# Patient Record
Sex: MALE | Race: WHITE | NOT HISPANIC OR LATINO | Employment: STUDENT | URBAN - METROPOLITAN AREA
[De-identification: names, ages, dates, MRNs, and addresses within clinical notes are randomized per-mention and may not be internally consistent; named-entity substitution may affect disease eponyms.]

---

## 2017-12-14 ENCOUNTER — GENERIC CONVERSION - ENCOUNTER (OUTPATIENT)
Dept: OTHER | Facility: OTHER | Age: 22
End: 2017-12-14

## 2017-12-15 ENCOUNTER — HOSPITAL ENCOUNTER (EMERGENCY)
Facility: HOSPITAL | Age: 22
Discharge: HOME | End: 2017-12-15
Attending: EMERGENCY MEDICINE | Admitting: EMERGENCY MEDICINE
Payer: COMMERCIAL

## 2017-12-15 VITALS
HEART RATE: 77 BPM | DIASTOLIC BLOOD PRESSURE: 58 MMHG | WEIGHT: 126.1 LBS | RESPIRATION RATE: 14 BRPM | OXYGEN SATURATION: 97 % | TEMPERATURE: 98.9 F | SYSTOLIC BLOOD PRESSURE: 119 MMHG

## 2017-12-15 DIAGNOSIS — L05.01 PILONIDAL ABSCESS: Primary | ICD-10-CM

## 2017-12-15 PROCEDURE — 99283 EMERGENCY DEPT VISIT LOW MDM: CPT

## 2017-12-15 RX ORDER — LIDOCAINE 40 MG/G
CREAM TOPICAL ONCE
Status: COMPLETED | OUTPATIENT
Start: 2017-12-15 | End: 2017-12-15

## 2017-12-15 RX ORDER — MORPHINE SULFATE 15 MG/1
15 TABLET ORAL ONCE
Status: COMPLETED | OUTPATIENT
Start: 2017-12-15 | End: 2017-12-15

## 2017-12-15 RX ORDER — LORAZEPAM 0.5 MG/1
0.5 TABLET ORAL ONCE
Status: COMPLETED | OUTPATIENT
Start: 2017-12-15 | End: 2017-12-15

## 2017-12-15 RX ORDER — MORPHINE SULFATE 30 MG/1
30 TABLET ORAL EVERY 4 HOURS PRN
Qty: 5 TABLET | Refills: 0 | Status: ON HOLD | OUTPATIENT
Start: 2017-12-15 | End: 2018-03-05 | Stop reason: ALTCHOICE

## 2017-12-15 RX ORDER — BUPIVACAINE HYDROCHLORIDE 5 MG/ML
30 INJECTION, SOLUTION EPIDURAL; INTRACAUDAL ONCE
Status: COMPLETED | OUTPATIENT
Start: 2017-12-15 | End: 2017-12-15

## 2017-12-15 RX ADMIN — LORAZEPAM 0.5 MG: 0.5 TABLET ORAL at 15:01

## 2017-12-15 RX ADMIN — MORPHINE SULFATE 15 MG: 15 TABLET ORAL at 14:24

## 2017-12-15 RX ADMIN — LIDOCAINE: 40 CREAM TOPICAL at 15:05

## 2017-12-15 RX ADMIN — BUPIVACAINE HYDROCHLORIDE 30 ML: 5 INJECTION, SOLUTION EPIDURAL; INTRACAUDAL at 14:24

## 2017-12-15 NOTE — DISCHARGE INSTRUCTIONS
Diagnosis: pilonidal abscess    - expect bloody drainage from area over next week - apply dressing as needed  Until drainage decreases    - wash gently with soap and water daily    - have someone inspect area aily -- please return to  t he er for any spreading redness around area any increasing pain - swelling- any redness/swelling around anus / any fevers- temp > 100 4    - please apply warm soapy soaks  To area once daily for 30 minutes at a time for next week     - the wound will heal over next several weeks     - please keep your appt with dr Artem Daugherty for next week     - for pain- can try over the counter naproxen 2-220 mg tablets taken with 2-500 mg over the counter tylenol tablets 2 times a day with meals    - for severe pain- morphine 1/2 to 1 tablet every 4-67 hrs as needed - note can cause nausea/ vomiting

## 2017-12-15 NOTE — ED PROCEDURE NOTE
PROCEDURE  Incision/Drainage  Date/Time: 12/15/2017 4:52 PM  Performed by: Donaldo Brito  Authorized by: Donaldo Brito     Patient location:  ED and bedside  Other Assisting Provider: Yes (comment)    Consent:     Consent obtained:  Verbal    Consent given by:  Patient and parent    Risks discussed:  Bleeding    Alternatives discussed:  No treatment  Universal protocol:     Procedure explained and questions answered to patient or proxy's satisfaction: yes      Patient identity confirmed:  Verbally with patient  Location:     Type:  Abscess (pilonidal area)    Size:  2    Location:  Anogenital    Anogenital location: r top of buttock crease  Pre-procedure details:     Procedure prep: alochol wipes  Anesthesia (see MAR for exact dosages): Anesthesia method:  Topical application and local infiltration    Topical anesthetic:  EMLA cream    Local anesthetic:  Bupivacaine 0 5% w/o epi (flied block with 5 ml of marcaine)  Procedure details:     Complexity:  Simple    Incision types:  Single straight    Scalpel blade:  15    Approach:  Open    Incision depth:  Subcutaneous    Techniques: baased on i and - pt woudl not be able to tolerate any  exploration and purulence completely expressed by kwasi dan     Irrigation with saline:  None    Hemostat:  None    Drainage:  Purulent    Drainage amount: Moderate    Wound treatment:  Wound left open  Post-procedure details:     Patient tolerance of procedure:   Tolerated well, no immediate complications  Comments:      Bandage applied by kwasi dan

## 2017-12-15 NOTE — ED PROVIDER NOTES
History  Chief Complaint   Patient presents with    Cyst     Pt has a cyst on his lower back  Pt has had this on and off with the last drainage in October 2016       22 yr male with hx of pilonidal abscess in past- with increasing pain over last week- no fevers- systemic comps- no hx of ca mrsa        History provided by:  Patient and parent   used: No        None       History reviewed  No pertinent past medical history  Past Surgical History:   Procedure Laterality Date    APPENDECTOMY      EYE SURGERY         History reviewed  No pertinent family history  I have reviewed and agree with the history as documented  Social History   Substance Use Topics    Smoking status: Never Smoker    Smokeless tobacco: Never Used    Alcohol use No        Review of Systems   Constitutional: Negative  HENT: Negative  Eyes: Negative  Respiratory: Negative  Cardiovascular: Negative  Gastrointestinal: Negative  Endocrine: Negative  Genitourinary: Negative  Musculoskeletal: Negative  Skin: Negative  Pilonidal abscess   Allergic/Immunologic: Negative  Neurological: Negative  Hematological: Negative  Psychiatric/Behavioral: Negative  Physical Exam  ED Triage Vitals [12/15/17 1347]   Temperature Pulse Respirations Blood Pressure SpO2   98 9 °F (37 2 °C) 77 14 119/58 97 %      Temp Source Heart Rate Source Patient Position - Orthostatic VS BP Location FiO2 (%)   Oral Monitor Lying Left arm --      Pain Score       9           Orthostatic Vital Signs  Vitals:    12/15/17 1347   BP: 119/58   Pulse: 77   Patient Position - Orthostatic VS: Lying       Physical Exam   Constitutional: He appears well-developed and well-nourished  He appears distressed     avss-- pulse ox 97 % on ra- interpretation is normal- no intervention    Musculoskeletal:   r top of buttock crease with 2 cm oval area of erytheam/ tendenress- no surropunding cellulitis- no crepitus-necrosis-- normal nal exam normal perienal exam    Skin: He is not diaphoretic  Nursing note and vitals reviewed  ED Medications  Medications   morphine (MSIR) IR tablet 15 mg (15 mg Oral Given 12/15/17 4447)   bupivacaine (PF) (MARCAINE) 0 5 % injection 30 mL (30 mL Infiltration Given by Other 12/15/17 9364)   LORazepam (ATIVAN) tablet 0 5 mg (0 5 mg Oral Given 12/15/17 0311)   lidocaine (LMX) 4 % cream ( Topical Given 12/15/17 8905)       Diagnostic Studies  Results Reviewed     None                 No orders to display              Procedures  Procedures       Phone Contacts  ED Phone Contact    ED Course  ED Course                                [facility]  CritCare Time    Disposition  Final diagnoses:   None     ED Disposition     None      Follow-up Information    None       Patient's Medications    No medications on file     No discharge procedures on file      ED Provider  Electronically Signed by           Sylvia Shafer MD  12/15/17 9351

## 2017-12-20 ENCOUNTER — GENERIC CONVERSION - ENCOUNTER (OUTPATIENT)
Dept: OTHER | Facility: OTHER | Age: 22
End: 2017-12-20
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
## 2018-01-24 VITALS
BODY MASS INDEX: 18.04 KG/M2 | HEIGHT: 70 IN | DIASTOLIC BLOOD PRESSURE: 68 MMHG | TEMPERATURE: 97.1 F | WEIGHT: 126 LBS | SYSTOLIC BLOOD PRESSURE: 114 MMHG

## 2018-02-26 RX ORDER — ADAPALENE 45 G/G
GEL TOPICAL
COMMUNITY
End: 2018-07-10 | Stop reason: SDUPTHER

## 2018-02-26 NOTE — PRE-PROCEDURE INSTRUCTIONS
Pre-Surgery Instructions:   Medication Instructions    adapalene (DIFFERIN) 0 1 % gel Instructed patient per Anesthesia Guidelines  Pre op and bathing instructions reviewed   Pt will get hibiclens

## 2018-03-01 NOTE — H&P
History and Physical -General Surgical Care   Zuly Hinds 26 y o  male MRN: [id number]  Unit/Bed#:  Encounter: [id number]       Principal Problem:  Pilonidal cyst    HPI: Zuly Hinds is a 26y o  year old male who presents with history of pilonidal cyst   He had several years of intermittent inflammation of a pilonidal cyst   Recently was opened up through the emergency room in mid December  He present to the office to discuss definitive removal       Review of Systems    Historical Information   Past Medical History:   Diagnosis Date    Murmur      Past Surgical History:   Procedure Laterality Date    APPENDECTOMY      EYE SURGERY      EYE SURGERY Left 2007    Eye tumor removed    UPPER GASTROINTESTINAL ENDOSCOPY       Social History   History   Alcohol Use    Yes     Comment: once per month     History   Drug Use No     History   Smoking Status    Never Smoker   Smokeless Tobacco    Never Used     History reviewed  No pertinent family history  Meds/Allergies     No prescriptions prior to admission  No current facility-administered medications for this encounter  No Known Allergies        There were no vitals taken for this visit  No intake or output data in the 24 hours ending 03/01/18 1312    PHYSICAL EXAM  General appearance: alert and oriented, in no acute distress  Lungs: clear to auscultation bilaterally  Heart: regular rate and rhythm, S1, S2 normal, no murmur, click, rub or gallop  Abdomen: soft, non-tender; bowel sounds normal; no masses,  no organomegaly  Rectal: deferred  Skin: Skin color, texture, turgor normal  No rashes or lesions  He has a well-healed incision and drainage site  The right changes in the midline cleft consistent with a pilonidal cyst    Lab Results:   No visits with results within 1 Day(s) from this visit  Latest known visit with results is:   No results found for any previous visit       Imaging Studies:     ASSESSMENT:  Pilonidal cyst    PLAN:  Risks and benefits for a pilonidal cystectomy including the potential of recurrence abscess or wound infection were discussed and he agrees to proceed  Counseling / Coordination of Care  Total time spent today  30 minutes  Greater than 50% of total time was spent with the patient and / or family counseling and / or coordination of care

## 2018-03-05 ENCOUNTER — ANESTHESIA (OUTPATIENT)
Dept: PERIOP | Facility: HOSPITAL | Age: 23
End: 2018-03-05
Payer: COMMERCIAL

## 2018-03-05 ENCOUNTER — ANESTHESIA EVENT (OUTPATIENT)
Dept: PERIOP | Facility: HOSPITAL | Age: 23
End: 2018-03-05
Payer: COMMERCIAL

## 2018-03-05 ENCOUNTER — HOSPITAL ENCOUNTER (OUTPATIENT)
Facility: HOSPITAL | Age: 23
Setting detail: OUTPATIENT SURGERY
Discharge: HOME | End: 2018-03-05
Attending: SURGERY | Admitting: SURGERY
Payer: COMMERCIAL

## 2018-03-05 VITALS
HEART RATE: 74 BPM | BODY MASS INDEX: 17.2 KG/M2 | TEMPERATURE: 97.9 F | DIASTOLIC BLOOD PRESSURE: 73 MMHG | SYSTOLIC BLOOD PRESSURE: 120 MMHG | WEIGHT: 127 LBS | RESPIRATION RATE: 16 BRPM | HEIGHT: 72 IN | OXYGEN SATURATION: 99 %

## 2018-03-05 DIAGNOSIS — L05.91 PILONIDAL CYST WITHOUT ABSCESS: Primary | ICD-10-CM

## 2018-03-05 PROCEDURE — 88304 TISSUE EXAM BY PATHOLOGIST: CPT | Performed by: PATHOLOGY

## 2018-03-05 PROCEDURE — 88304 TISSUE EXAM BY PATHOLOGIST: CPT | Performed by: SURGERY

## 2018-03-05 RX ORDER — MEPERIDINE HYDROCHLORIDE 25 MG/ML
12.5 INJECTION INTRAMUSCULAR; INTRAVENOUS; SUBCUTANEOUS
Status: DISCONTINUED | OUTPATIENT
Start: 2018-03-05 | End: 2018-03-05 | Stop reason: HOSPADM

## 2018-03-05 RX ORDER — ONDANSETRON 2 MG/ML
INJECTION INTRAMUSCULAR; INTRAVENOUS AS NEEDED
Status: DISCONTINUED | OUTPATIENT
Start: 2018-03-05 | End: 2018-03-05 | Stop reason: SURG

## 2018-03-05 RX ORDER — KETOROLAC TROMETHAMINE 30 MG/ML
INJECTION, SOLUTION INTRAMUSCULAR; INTRAVENOUS AS NEEDED
Status: DISCONTINUED | OUTPATIENT
Start: 2018-03-05 | End: 2018-03-05 | Stop reason: SURG

## 2018-03-05 RX ORDER — ONDANSETRON 2 MG/ML
4 INJECTION INTRAMUSCULAR; INTRAVENOUS EVERY 4 HOURS PRN
Status: DISCONTINUED | OUTPATIENT
Start: 2018-03-05 | End: 2018-03-05 | Stop reason: HOSPADM

## 2018-03-05 RX ORDER — MORPHINE SULFATE 10 MG/ML
4 INJECTION, SOLUTION INTRAMUSCULAR; INTRAVENOUS
Status: DISCONTINUED | OUTPATIENT
Start: 2018-03-05 | End: 2018-03-05 | Stop reason: HOSPADM

## 2018-03-05 RX ORDER — FENTANYL CITRATE/PF 50 MCG/ML
25 SYRINGE (ML) INJECTION
Status: DISCONTINUED | OUTPATIENT
Start: 2018-03-05 | End: 2018-03-05 | Stop reason: HOSPADM

## 2018-03-05 RX ORDER — GLYCOPYRROLATE 0.2 MG/ML
INJECTION INTRAMUSCULAR; INTRAVENOUS AS NEEDED
Status: DISCONTINUED | OUTPATIENT
Start: 2018-03-05 | End: 2018-03-05 | Stop reason: SURG

## 2018-03-05 RX ORDER — OXYCODONE HYDROCHLORIDE AND ACETAMINOPHEN 5; 325 MG/1; MG/1
2 TABLET ORAL EVERY 4 HOURS PRN
Status: DISCONTINUED | OUTPATIENT
Start: 2018-03-05 | End: 2018-03-05 | Stop reason: HOSPADM

## 2018-03-05 RX ORDER — OXYCODONE HYDROCHLORIDE AND ACETAMINOPHEN 5; 325 MG/1; MG/1
2 TABLET ORAL EVERY 4 HOURS PRN
Qty: 28 TABLET | Refills: 0 | Status: SHIPPED | OUTPATIENT
Start: 2018-03-05

## 2018-03-05 RX ORDER — BUPIVACAINE HYDROCHLORIDE AND EPINEPHRINE 2.5; 5 MG/ML; UG/ML
INJECTION, SOLUTION EPIDURAL; INFILTRATION; INTRACAUDAL; PERINEURAL AS NEEDED
Status: DISCONTINUED | OUTPATIENT
Start: 2018-03-05 | End: 2018-03-05 | Stop reason: HOSPADM

## 2018-03-05 RX ORDER — PROPOFOL 10 MG/ML
INJECTION, EMULSION INTRAVENOUS AS NEEDED
Status: DISCONTINUED | OUTPATIENT
Start: 2018-03-05 | End: 2018-03-05 | Stop reason: SURG

## 2018-03-05 RX ORDER — SODIUM CHLORIDE, SODIUM LACTATE, POTASSIUM CHLORIDE, CALCIUM CHLORIDE 600; 310; 30; 20 MG/100ML; MG/100ML; MG/100ML; MG/100ML
125 INJECTION, SOLUTION INTRAVENOUS CONTINUOUS
Status: DISCONTINUED | OUTPATIENT
Start: 2018-03-05 | End: 2018-03-05 | Stop reason: HOSPADM

## 2018-03-05 RX ORDER — CEFAZOLIN SODIUM 1 G/3ML
INJECTION, POWDER, FOR SOLUTION INTRAMUSCULAR; INTRAVENOUS AS NEEDED
Status: DISCONTINUED | OUTPATIENT
Start: 2018-03-05 | End: 2018-03-05 | Stop reason: SURG

## 2018-03-05 RX ORDER — DIPHENHYDRAMINE HYDROCHLORIDE 50 MG/ML
12.5 INJECTION INTRAMUSCULAR; INTRAVENOUS ONCE AS NEEDED
Status: DISCONTINUED | OUTPATIENT
Start: 2018-03-05 | End: 2018-03-05 | Stop reason: HOSPADM

## 2018-03-05 RX ORDER — FENTANYL CITRATE 50 UG/ML
INJECTION, SOLUTION INTRAMUSCULAR; INTRAVENOUS AS NEEDED
Status: DISCONTINUED | OUTPATIENT
Start: 2018-03-05 | End: 2018-03-05 | Stop reason: SURG

## 2018-03-05 RX ORDER — SODIUM CHLORIDE 9 MG/ML
INJECTION, SOLUTION INTRAVENOUS CONTINUOUS PRN
Status: DISCONTINUED | OUTPATIENT
Start: 2018-03-05 | End: 2018-03-05 | Stop reason: SURG

## 2018-03-05 RX ORDER — MIDAZOLAM HYDROCHLORIDE 1 MG/ML
INJECTION INTRAMUSCULAR; INTRAVENOUS AS NEEDED
Status: DISCONTINUED | OUTPATIENT
Start: 2018-03-05 | End: 2018-03-05 | Stop reason: SURG

## 2018-03-05 RX ORDER — ROCURONIUM BROMIDE 10 MG/ML
INJECTION, SOLUTION INTRAVENOUS AS NEEDED
Status: DISCONTINUED | OUTPATIENT
Start: 2018-03-05 | End: 2018-03-05 | Stop reason: SURG

## 2018-03-05 RX ADMIN — LIDOCAINE HYDROCHLORIDE 60 MG: 20 INJECTION, SOLUTION INTRAVENOUS at 12:12

## 2018-03-05 RX ADMIN — GLYCOPYRROLATE 0.8 MG: 0.2 INJECTION, SOLUTION INTRAMUSCULAR; INTRAVENOUS at 12:43

## 2018-03-05 RX ADMIN — MIDAZOLAM HYDROCHLORIDE 2 MG: 1 INJECTION, SOLUTION INTRAMUSCULAR; INTRAVENOUS at 12:04

## 2018-03-05 RX ADMIN — ONDANSETRON 4 MG: 2 INJECTION INTRAMUSCULAR; INTRAVENOUS at 12:04

## 2018-03-05 RX ADMIN — PROPOFOL 200 MG: 10 INJECTION, EMULSION INTRAVENOUS at 12:12

## 2018-03-05 RX ADMIN — CEFAZOLIN SODIUM 1000 MG: 1 INJECTION, POWDER, FOR SOLUTION INTRAMUSCULAR; INTRAVENOUS at 12:17

## 2018-03-05 RX ADMIN — ROCURONIUM BROMIDE 25 MG: 10 INJECTION INTRAVENOUS at 12:12

## 2018-03-05 RX ADMIN — FENTANYL CITRATE 50 MCG: 50 INJECTION INTRAMUSCULAR; INTRAVENOUS at 12:12

## 2018-03-05 RX ADMIN — DEXAMETHASONE SODIUM PHOSPHATE 5 MG: 10 INJECTION INTRAMUSCULAR; INTRAVENOUS at 12:12

## 2018-03-05 RX ADMIN — NEOSTIGMINE METHYLSULFATE 4 MG: 1 INJECTION, SOLUTION INTRAMUSCULAR; INTRAVENOUS; SUBCUTANEOUS at 12:43

## 2018-03-05 RX ADMIN — SODIUM CHLORIDE: 0.9 INJECTION, SOLUTION INTRAVENOUS at 11:55

## 2018-03-05 RX ADMIN — KETOROLAC TROMETHAMINE 30 MG: 30 INJECTION, SOLUTION INTRAMUSCULAR at 12:38

## 2018-03-05 NOTE — ANESTHESIA POSTPROCEDURE EVALUATION
Post-Op Assessment Note      CV Status:  Stable    Mental Status:  Alert and awake    Hydration Status:  Euvolemic    PONV Controlled:  Controlled    Airway Patency:  Patent    Post Op Vitals Reviewed: Yes          Staff: CRNA           /70 (03/05/18 1256)    Temp 97 7 °F (36 5 °C) (03/05/18 1256)    Pulse (!) 116 (03/05/18 1256)   Resp 18 (03/05/18 1256)    SpO2 100 % (03/05/18 1256)

## 2018-03-05 NOTE — DISCHARGE INSTRUCTIONS
May change dressing daily  May shower bathe daily      Apply ice as needed to wound    May use Tylenol or ibuprofen as needed for pain    Expect some light drainage in between the sutures    Please call Dr Anoop Bustamante at [phone] with questions or concerns

## 2018-03-05 NOTE — ANESTHESIA PREPROCEDURE EVALUATION
Hx  L Eye tumor Removed    Review of Systems/Medical History          Cardiovascular  Valvular heart disease , H/O Heart Murmur,    Pulmonary  Not a smoker ,        GI/Hepatic            Endo/Other     GYN       Hematology   Musculoskeletal       Neurology   Psychology           Physical Exam    Airway  Comment: Bearded    Mallampati score: I  TM Distance: >3 FB  Neck ROM: full     Dental   No notable dental hx     Cardiovascular      Pulmonary      Other Findings        Anesthesia Plan  ASA Score- 1     Anesthesia Type- general with ASA Monitors  Additional Monitors:   Airway Plan: ETT  Plan Factors-Patient not instructed to abstain from smoking on day of procedure  Patient did not smoke on day of surgery  Induction- intravenous  Postoperative Plan-     Informed Consent- Anesthetic plan and risks discussed with patient, mother and father  I personally reviewed this patient with the CRNA  Discussed and agreed on the Anesthesia Plan with the CRNA  Prem Sandoval

## 2018-03-05 NOTE — OP NOTE
OPERATIVE REPORT  PATIENT NAME: Bony Rowan    :  1995  MRN: [id number]  Pt Location: [facility] OR ROOM 02    SURGERY DATE: 3/5/2018    Surgeon(s) and Role:     * Fei Syed DO - Primary    Preop Diagnosis:  Pilonidal cyst without abscess [L05 ]    Post-Op Diagnosis Codes:     * Pilonidal cyst without abscess [L05 ]    Procedure(s) (LRB):  PILONIDAL CYST EXCISION (N/A)    Specimen(s):  ID Type Source Tests Collected by Time Destination   1 :  Tissue Pilonidal Cyst/Sinus TISSUE EXAM Mary DO Milla 3/5/2018 1233        Estimated Blood Loss:   Minimal    Drains:       Anesthesia Type:   General/local    Operative Indications:  Pilonidal cyst without abscess [L]      Operative Findings:  Pilonidal cyst      Review of Systems/Medical History      Cardiovascular  Valvular heart disease , H/O Heart Murmur,  Pulmonary  Not a smoker ,       GI/Hepatic          Endo/Other    GYN      Hematology Musculoskeletal      Neurology Psychology         Height 72 inches weight 58 kg/127 lb BMI 17  ASA 1  Wound class 2    Complications:   None    Procedure and Technique:  Patient was brought the ER proceed identified by visualization, conversation, by armband  Sequential compression pumps were placed  After placed under general anesthesia he was then placed prone to help expose the sacral area  Was given Ancef perioperatively  The buttocks were slightly taped apart to help expose the pilonidal cyst area  This areas prepped and draped in a sterile fashion time-out was performed assured that the prep was dry  Local was instilled the skin surrounding tissues of a small pilonidal cyst a crescent type of elliptical skin incision was then made to encompass the cyst opening  This was taken down to the presacral fascia entirely removed  Hemostasis was assured  Did undermine underneath 1 side of the incision to help close  Irrigation is carried out more local was instilled   0 Vicryl was used to reapproximate the deeper subcutaneous tissues  Two 0 Vicryl was used to reapproximate the superficial subcutaneous tissues  Three 0 nylon was used in a mattress fashion to close skin wounds were washed and dried  Sterile histoacryl was then applied  4 x 4 sponges were also placed to help pad the wound  He was awake in the operating room and returned to recovery area in stable condition having tolerated procedure well     I was present for the entire procedure    Patient Disposition:  PACU     SIGNATURE: Dre Vines DO  DATE: March 5, 2018  TIME: 12:45 PM

## 2018-07-10 DIAGNOSIS — L70.0 ACNE VULGARIS: Primary | ICD-10-CM

## 2018-07-10 RX ORDER — ADAPALENE 45 G/G
GEL TOPICAL
Qty: 45 G | Refills: 3 | Status: SHIPPED | OUTPATIENT
Start: 2018-07-10

## 2018-10-25 ENCOUNTER — IMMUNIZATION (OUTPATIENT)
Dept: FAMILY MEDICINE CLINIC | Facility: CLINIC | Age: 23
End: 2018-10-25
Payer: COMMERCIAL

## 2018-10-25 DIAGNOSIS — Z23 ENCOUNTER FOR IMMUNIZATION: ICD-10-CM

## 2018-10-25 PROCEDURE — 90686 IIV4 VACC NO PRSV 0.5 ML IM: CPT

## 2018-10-25 PROCEDURE — 90471 IMMUNIZATION ADMIN: CPT

## 2024-05-24 ENCOUNTER — TELEPHONE (OUTPATIENT)
Age: 29
End: 2024-05-24

## 2024-05-24 NOTE — TELEPHONE ENCOUNTER
Pt called on advice of  [facility], who he had recently seen.  That video doctor had given him referrals to urology, gastroenterology and an allergist.  Asked pt if he was told to establish care with a family doctor or make appts with the specialists.  Pt was not sure.  He will call to clarify where he is supposed to schedule an appt.  Provided pt with phone number for Central Scheduling in case he is supposed to call to set up appointments with the specialists he was referred to.  Pt will call back if he is to establish care with a family doctor.

## 2024-06-04 ENCOUNTER — TELEPHONE (OUTPATIENT)
Age: 29
End: 2024-06-04

## 2024-06-04 NOTE — TELEPHONE ENCOUNTER
Siria from  [facility], Dr Rojas office sent over a referral request for a PCP for pt. PT sees them virtually but she would like him to set up an appt with a PCP he can see in person in an office if needed. I conf we recvd the referral request but no appt was made as of yet. She will contact the pt and encourage him to call to schedule

## (undated) DEVICE — SUT ETHILON 3-0 FSLX 30 IN 1673H

## (undated) DEVICE — SPONGE STICK WITH PVP-I: Brand: KENDALL

## (undated) DEVICE — REM POLYHESIVE ADULT PATIENT RETURN ELECTRODE: Brand: VALLEYLAB

## (undated) DEVICE — STRL UNIVERSAL MINOR GENERAL: Brand: CARDINAL HEALTH

## (undated) DEVICE — UNDYED BRAIDED (POLYGLACTIN 910), SYNTHETIC ABSORBABLE SUTURE: Brand: COATED VICRYL

## (undated) DEVICE — GLOVE SRG BIOGEL ORTHOPEDIC 8

## (undated) DEVICE — GAUZE SPONGES,16 PLY: Brand: CURITY

## (undated) DEVICE — IV CATH INTROCAN 18G X 1 1/4 SAFETY

## (undated) DEVICE — SCD SEQUENTIAL COMPRESSION COMFORT SLEEVE MEDIUM KNEE LENGTH: Brand: KENDALL SCD

## (undated) DEVICE — SUT VICRYL 2-0 SH 27 IN UNDYED J417H

## (undated) DEVICE — PENROSE DRAIN, 18 X 3 8: Brand: CARDINAL HEALTH

## (undated) DEVICE — PENCILETTE SMOKE EVAC PUSH BUTTON COATED

## (undated) DEVICE — INTENDED FOR TISSUE SEPARATION, AND OTHER PROCEDURES THAT REQUIRE A SHARP SURGICAL BLADE TO PUNCTURE OR CUT.: Brand: BARD-PARKER SAFETY BLADES SIZE 15, STERILE

## (undated) DEVICE — TUBING SUCTION 5MM X 12 FT

## (undated) DEVICE — POOLE SUCTION HANDLE: Brand: CARDINAL HEALTH

## (undated) DEVICE — 3M™ DURAPORE™ SURGICAL TAPE 1538-3, 3 INCH X 10 YARD (7,5CM X 9,1M), 4 ROLLS/BOX: Brand: 3M™ DURAPORE™

## (undated) DEVICE — ADHESIVE SKN CLSR HISTOACRYL FLEX 0.5ML LF

## (undated) DEVICE — LIGHT HANDLE COVER SLEEVE DISP BLUE STELLAR

## (undated) DEVICE — NEEDLE 22 G X 1 1/2 SAFETY

## (undated) DEVICE — SUT VICRYL 3-0 SH 27 IN J416H

## (undated) DEVICE — VIAL DECANTER